# Patient Record
Sex: FEMALE | Race: BLACK OR AFRICAN AMERICAN | NOT HISPANIC OR LATINO | URBAN - METROPOLITAN AREA
[De-identification: names, ages, dates, MRNs, and addresses within clinical notes are randomized per-mention and may not be internally consistent; named-entity substitution may affect disease eponyms.]

---

## 2022-10-13 ENCOUNTER — OFFICE VISIT (OUTPATIENT)
Dept: URBAN - METROPOLITAN AREA CLINIC 52 | Facility: CLINIC | Age: 82
End: 2022-10-13

## 2022-10-13 RX ORDER — ALPRAZOLAM 0.25 MG/1
1 TABLET TABLET ORAL PRN
Status: ACTIVE | COMMUNITY

## 2022-10-13 RX ORDER — FUROSEMIDE 20 MG/1
1 TABLET TABLET ORAL ONCE A DAY
Status: ACTIVE | COMMUNITY

## 2022-10-13 RX ORDER — NIFEDIPINE 60 MG/1
1 TABLET ON AN EMPTY STOMACH TABLET, EXTENDED RELEASE ORAL BID
Status: ACTIVE | COMMUNITY

## 2022-10-13 RX ORDER — ENALAPRIL MALEATE 10 MG/1
1 TABLET TABLET ORAL ONCE A DAY
Status: ACTIVE | COMMUNITY

## 2022-10-13 RX ORDER — HYDRALAZINE HYDROCHLORIDE 25 MG/1
1 TABLET WITH FOOD TABLET, FILM COATED ORAL TWICE A DAY
Status: ACTIVE | COMMUNITY

## 2022-10-13 RX ORDER — ISOSORBIDE DINITRATE 20 MG/1
1 TABLET TABLET ORAL TID
Status: ACTIVE | COMMUNITY

## 2022-10-13 RX ORDER — METOPROLOL SUCCINATE 25 MG/1
1 TABLET TABLET, FILM COATED, EXTENDED RELEASE ORAL ONCE A DAY
Status: ACTIVE | COMMUNITY

## 2022-10-13 RX ORDER — POTASSIUM CHLORIDE 1500 MG/1
2 TABLET WITH FOOD TABLET, EXTENDED RELEASE ORAL ONCE A DAY
Status: ACTIVE | COMMUNITY

## 2022-10-13 RX ORDER — TRAMADOL HYDROCHLORIDE 50 MG/1
1 TABLET AS NEEDED TABLET, FILM COATED ORAL ONCE A DAY
Status: ACTIVE | COMMUNITY

## 2022-10-13 RX ORDER — FLUVOXAMINE MALEATE 25 MG/1
1 TABLET TABLET, FILM COATED ORAL ONCE A DAY
Status: ACTIVE | COMMUNITY

## 2022-11-02 ENCOUNTER — OFFICE VISIT (OUTPATIENT)
Dept: URBAN - METROPOLITAN AREA CLINIC 52 | Facility: CLINIC | Age: 82
End: 2022-11-02

## 2022-11-02 RX ORDER — ISOSORBIDE DINITRATE 20 MG/1
1 TABLET TABLET ORAL TID
Status: ON HOLD | COMMUNITY

## 2022-11-02 RX ORDER — ENALAPRIL MALEATE 10 MG/1
1 TABLET TABLET ORAL ONCE A DAY
Status: ON HOLD | COMMUNITY

## 2022-11-02 RX ORDER — NIFEDIPINE 60 MG/1
1 TABLET ON AN EMPTY STOMACH TABLET, EXTENDED RELEASE ORAL BID
Status: ON HOLD | COMMUNITY

## 2022-11-02 RX ORDER — METOPROLOL SUCCINATE 25 MG/1
1 TABLET TABLET, FILM COATED, EXTENDED RELEASE ORAL ONCE A DAY
Status: ON HOLD | COMMUNITY

## 2022-11-02 RX ORDER — HYDRALAZINE HYDROCHLORIDE 25 MG/1
1 TABLET WITH FOOD TABLET, FILM COATED ORAL TWICE A DAY
Status: ON HOLD | COMMUNITY

## 2022-11-02 RX ORDER — FLUVOXAMINE MALEATE 25 MG/1
1 TABLET TABLET, FILM COATED ORAL ONCE A DAY
Status: ON HOLD | COMMUNITY

## 2022-11-02 RX ORDER — TRAMADOL HYDROCHLORIDE 50 MG/1
1 TABLET AS NEEDED TABLET, FILM COATED ORAL ONCE A DAY
Status: ON HOLD | COMMUNITY

## 2022-11-02 RX ORDER — ALPRAZOLAM 0.25 MG/1
1 TABLET TABLET ORAL PRN
Status: ON HOLD | COMMUNITY

## 2022-11-02 RX ORDER — FUROSEMIDE 20 MG/1
1 TABLET TABLET ORAL ONCE A DAY
Status: ON HOLD | COMMUNITY

## 2022-11-02 RX ORDER — POTASSIUM CHLORIDE 1500 MG/1
2 TABLET WITH FOOD TABLET, EXTENDED RELEASE ORAL ONCE A DAY
Status: ON HOLD | COMMUNITY

## 2022-11-11 ENCOUNTER — OFFICE VISIT (OUTPATIENT)
Dept: URBAN - METROPOLITAN AREA CLINIC 52 | Facility: CLINIC | Age: 82
End: 2022-11-11

## 2022-11-11 RX ORDER — ALPRAZOLAM 0.25 MG/1
1 TABLET TABLET ORAL PRN
Status: ON HOLD | COMMUNITY

## 2022-11-11 RX ORDER — FUROSEMIDE 20 MG/1
1 TABLET TABLET ORAL ONCE A DAY
Status: ON HOLD | COMMUNITY

## 2022-11-11 RX ORDER — ENALAPRIL MALEATE 10 MG/1
1 TABLET TABLET ORAL ONCE A DAY
Status: ON HOLD | COMMUNITY

## 2022-11-11 RX ORDER — NIFEDIPINE 60 MG/1
1 TABLET ON AN EMPTY STOMACH TABLET, EXTENDED RELEASE ORAL BID
Status: ON HOLD | COMMUNITY

## 2022-11-11 RX ORDER — METOPROLOL SUCCINATE 25 MG/1
1 TABLET TABLET, FILM COATED, EXTENDED RELEASE ORAL ONCE A DAY
Status: ON HOLD | COMMUNITY

## 2022-11-11 RX ORDER — ISOSORBIDE DINITRATE 20 MG/1
1 TABLET TABLET ORAL TID
Status: ON HOLD | COMMUNITY

## 2022-11-11 RX ORDER — POTASSIUM CHLORIDE 1500 MG/1
2 TABLET WITH FOOD TABLET, EXTENDED RELEASE ORAL ONCE A DAY
Status: ON HOLD | COMMUNITY

## 2022-11-11 RX ORDER — HYDRALAZINE HYDROCHLORIDE 25 MG/1
1 TABLET WITH FOOD TABLET, FILM COATED ORAL TWICE A DAY
Status: ON HOLD | COMMUNITY

## 2022-11-11 RX ORDER — FLUVOXAMINE MALEATE 25 MG/1
1 TABLET TABLET, FILM COATED ORAL ONCE A DAY
Status: ON HOLD | COMMUNITY

## 2022-11-11 RX ORDER — TRAMADOL HYDROCHLORIDE 50 MG/1
1 TABLET AS NEEDED TABLET, FILM COATED ORAL ONCE A DAY
Status: ON HOLD | COMMUNITY

## 2022-12-08 ENCOUNTER — DASHBOARD ENCOUNTERS (OUTPATIENT)
Age: 82
End: 2022-12-08

## 2022-12-08 ENCOUNTER — OFFICE VISIT (OUTPATIENT)
Dept: URBAN - METROPOLITAN AREA CLINIC 52 | Facility: CLINIC | Age: 82
End: 2022-12-08
Payer: MEDICARE

## 2022-12-08 VITALS
HEART RATE: 66 BPM | TEMPERATURE: 97.7 F | SYSTOLIC BLOOD PRESSURE: 131 MMHG | WEIGHT: 123 LBS | OXYGEN SATURATION: 95 % | BODY MASS INDEX: 23.22 KG/M2 | DIASTOLIC BLOOD PRESSURE: 61 MMHG | HEIGHT: 61 IN

## 2022-12-08 DIAGNOSIS — K66.1: ICD-10-CM

## 2022-12-08 PROCEDURE — 99203 OFFICE O/P NEW LOW 30 MIN: CPT | Performed by: PHYSICIAN ASSISTANT

## 2022-12-08 RX ORDER — ENALAPRIL MALEATE 10 MG/1
1 TABLET TABLET ORAL ONCE A DAY
Status: ACTIVE | COMMUNITY

## 2022-12-08 RX ORDER — ISOSORBIDE DINITRATE 20 MG/1
1 TABLET TABLET ORAL TID
Status: ACTIVE | COMMUNITY

## 2022-12-08 RX ORDER — METOPROLOL SUCCINATE 25 MG/1
1 TABLET TABLET, FILM COATED, EXTENDED RELEASE ORAL ONCE A DAY
Status: ACTIVE | COMMUNITY

## 2022-12-08 RX ORDER — FUROSEMIDE 20 MG/1
1 TABLET TABLET ORAL ONCE A DAY
Status: ACTIVE | COMMUNITY

## 2022-12-08 RX ORDER — ALPRAZOLAM 0.25 MG/1
1 TABLET TABLET ORAL PRN
Status: ON HOLD | COMMUNITY

## 2022-12-08 RX ORDER — TRAMADOL HYDROCHLORIDE 50 MG/1
1 TABLET AS NEEDED TABLET, FILM COATED ORAL ONCE A DAY
Status: ON HOLD | COMMUNITY

## 2022-12-08 RX ORDER — HYDRALAZINE HYDROCHLORIDE 25 MG/1
1 TABLET WITH FOOD TABLET, FILM COATED ORAL TWICE A DAY
Status: ACTIVE | COMMUNITY

## 2022-12-08 RX ORDER — NIFEDIPINE 60 MG/1
1 TABLET ON AN EMPTY STOMACH TABLET, EXTENDED RELEASE ORAL BID
Status: ACTIVE | COMMUNITY

## 2022-12-08 RX ORDER — FLUVOXAMINE MALEATE 25 MG/1
1 TABLET TABLET, FILM COATED ORAL ONCE A DAY
Status: ACTIVE | COMMUNITY

## 2022-12-08 RX ORDER — POTASSIUM CHLORIDE 1500 MG/1
2 TABLET WITH FOOD TABLET, EXTENDED RELEASE ORAL ONCE A DAY
Status: ON HOLD | COMMUNITY

## 2022-12-08 NOTE — HPI-TODAY'S VISIT:
This is an 82 y.o. female who presents to office for hospital follow up. She was admitted to [facility] from 6/21/22 - 6/27/22 for hemoperitoneum of unknown etiology, sepsis, anemia, and acute kidney injury superimposed on CKD. Surgery was consulted for hemoperitoneum, but patient wanted conservative tx.  Currently, she has no GI complaints and continues to follow all specialists.